# Patient Record
Sex: MALE | Race: WHITE | Employment: FULL TIME | URBAN - METROPOLITAN AREA
[De-identification: names, ages, dates, MRNs, and addresses within clinical notes are randomized per-mention and may not be internally consistent; named-entity substitution may affect disease eponyms.]

---

## 2019-04-10 ENCOUNTER — MEDICAL CORRESPONDENCE (OUTPATIENT)
Dept: HEALTH INFORMATION MANAGEMENT | Facility: CLINIC | Age: 44
End: 2019-04-10

## 2019-04-23 ENCOUNTER — INFUSION THERAPY VISIT (OUTPATIENT)
Dept: INFUSION THERAPY | Facility: CLINIC | Age: 44
End: 2019-04-23
Attending: INTERNAL MEDICINE
Payer: MEDICARE

## 2019-04-23 DIAGNOSIS — D75.1 POLYCYTHEMIA, SECONDARY: Primary | ICD-10-CM

## 2019-04-23 PROCEDURE — 99195 PHLEBOTOMY: CPT

## 2019-04-23 NOTE — PROGRESS NOTES
Infusion Nursing Note:  José Miguel Mercado presents today for Phlebotomy.    Patient seen by provider today: No   present during visit today: Not Applicable.    Note: N/A.    Intravenous Access:  Phleb kit.    Treatment Conditions:  One time order sent to Infusion.      Post Infusion Assessment:  Patient tolerated procedure without incident.  BP pre procedure 167/102  500cc Phleb done without difficulty.  Post /89       Discharge Plan:   Patient discharged in stable condition accompanied by: son.    Jonas Browning RN

## 2019-06-12 ENCOUNTER — TRANSFERRED RECORDS (OUTPATIENT)
Dept: HEALTH INFORMATION MANAGEMENT | Facility: CLINIC | Age: 44
End: 2019-06-12

## 2019-06-17 ENCOUNTER — INFUSION THERAPY VISIT (OUTPATIENT)
Dept: INFUSION THERAPY | Facility: CLINIC | Age: 44
End: 2019-06-17
Attending: INTERNAL MEDICINE
Payer: MEDICARE

## 2019-06-17 DIAGNOSIS — D75.1 POLYCYTHEMIA, SECONDARY: Primary | ICD-10-CM

## 2019-06-17 PROCEDURE — 99195 PHLEBOTOMY: CPT

## 2019-06-17 NOTE — PROGRESS NOTES
Infusion Nursing Note:  José Miguel Mercado presents today for phleb.    Patient seen by provider today: No   present during visit today: Not Applicable.    Note: N/A.    Intravenous Access:  Phleb kit.    Treatment Conditions:  Not Applicable.      Post Infusion Assessment:  Patient tolerated procedure without incident.  Pre procedure /97  500cc phleb done without difficulty.  Post /97       Discharge Plan:   Patient discharged in stable condition accompanied by: son.    Jonas Browning RN

## 2019-08-16 ENCOUNTER — INFUSION THERAPY VISIT (OUTPATIENT)
Dept: INFUSION THERAPY | Facility: CLINIC | Age: 44
End: 2019-08-16
Attending: INTERNAL MEDICINE
Payer: MEDICARE

## 2019-08-16 VITALS — SYSTOLIC BLOOD PRESSURE: 162 MMHG | DIASTOLIC BLOOD PRESSURE: 107 MMHG | HEART RATE: 62 BPM

## 2019-08-16 DIAGNOSIS — D75.1 POLYCYTHEMIA, SECONDARY: Primary | ICD-10-CM

## 2019-08-16 PROCEDURE — 99195 PHLEBOTOMY: CPT

## 2019-08-16 NOTE — PROGRESS NOTES
Infusion Nursing Note:  José Miguel Acevedoer presents today for therapeutic phlebotomy.    Patient seen by provider today: No   present during visit today: Not Applicable.    Note: N/A.    Intravenous Access:  No IV access today.  Phlebotomy performed in Rt. AC after 2 failed attempts.    Treatment Conditions:  Not Applicable.    Post Infusion Assessment:  Patient tolerated removal of 500 ml whole bood without incident.       Discharge Plan:   Patient discharged in stable condition accompanied by: self.  Departure Mode: Wheelchair.      Genie Barney RN

## 2019-10-15 ENCOUNTER — INFUSION THERAPY VISIT (OUTPATIENT)
Dept: INFUSION THERAPY | Facility: CLINIC | Age: 44
End: 2019-10-15
Attending: INTERNAL MEDICINE
Payer: MEDICARE

## 2019-10-15 VITALS — DIASTOLIC BLOOD PRESSURE: 98 MMHG | SYSTOLIC BLOOD PRESSURE: 145 MMHG | HEART RATE: 74 BPM

## 2019-10-15 DIAGNOSIS — D75.1 POLYCYTHEMIA, SECONDARY: Primary | ICD-10-CM

## 2019-10-15 PROCEDURE — 99195 PHLEBOTOMY: CPT

## 2019-10-15 NOTE — PROGRESS NOTES
Infusion Nursing Note:  José Miguel CATHERINE Jess presents today for therapeutic phlebotomy.    Patient seen by provider today: No   present during visit today: Not Applicable.    Note: N/A.    Intravenous Access:  No Intravenous access/labs at this visit.    Treatment Conditions:  Not Applicable.      Post Infusion Assessment:  Phlebotomy performed in left arm.  500 ml whole blood removed.  Bleeding under the skin in apparent after the phlebotomy.  Pt tolerated the procedure without difficulty.       Discharge Plan:   Patient discharged in stable condition accompanied by: self via wheelchair.      Genie Barney RN

## 2019-12-19 ENCOUNTER — INFUSION THERAPY VISIT (OUTPATIENT)
Dept: INFUSION THERAPY | Facility: CLINIC | Age: 44
End: 2019-12-19
Attending: INTERNAL MEDICINE
Payer: MEDICARE

## 2019-12-19 VITALS — DIASTOLIC BLOOD PRESSURE: 104 MMHG | HEART RATE: 60 BPM | SYSTOLIC BLOOD PRESSURE: 149 MMHG | TEMPERATURE: 96.6 F

## 2019-12-19 DIAGNOSIS — D75.1 POLYCYTHEMIA, SECONDARY: Primary | ICD-10-CM

## 2019-12-19 PROCEDURE — 99207 ZZC NO CHARGE NURSE ONLY: CPT

## 2019-12-19 PROCEDURE — 99195 PHLEBOTOMY: CPT

## 2019-12-19 NOTE — PROGRESS NOTES
Infusion Nursing Note:  José Miguel Mercado presents today for Phlebotomy.    Patient seen by provider today: No   present during visit today: Not Applicable.    Note: Pt here for 500 mL phlebotomy, access via left AC.    Treatment Conditions:  Not Applicable.      Discharge Plan:   Patient discharged in stable condition accompanied by: self.  Departure Mode: Wheelchair.    Jennifer Cuevas RN

## 2020-02-13 ENCOUNTER — INFUSION THERAPY VISIT (OUTPATIENT)
Dept: INFUSION THERAPY | Facility: CLINIC | Age: 45
End: 2020-02-13
Attending: INTERNAL MEDICINE
Payer: MEDICARE

## 2020-02-13 VITALS — SYSTOLIC BLOOD PRESSURE: 117 MMHG | HEART RATE: 62 BPM | DIASTOLIC BLOOD PRESSURE: 70 MMHG

## 2020-02-13 DIAGNOSIS — D75.1 POLYCYTHEMIA, SECONDARY: Primary | ICD-10-CM

## 2020-02-13 PROCEDURE — 99195 PHLEBOTOMY: CPT

## 2020-02-13 NOTE — PROGRESS NOTES
Infusion Nursing Note:  José Miguel Mercado presents today for therapeutic phlebotomy.    Patient seen by provider today: No   present during visit today: Not Applicable.    Note: N/A.    Intravenous Access:  No IV access today.     Treatment Conditions:  Phlebotomy is ordered as periodic and no parameters are attached to the order.       Post Infusion Assessment:  Pt tolerated removal of 500 ml whole blood without difficulty    Discharge Plan:   Pt left the infusion center before RN could take the post procedure vital signs.  Departure Mode: Wheelchair.    Genie Barney RN

## 2021-06-02 ENCOUNTER — RECORDS - HEALTHEAST (OUTPATIENT)
Dept: ADMINISTRATIVE | Facility: CLINIC | Age: 46
End: 2021-06-02